# Patient Record
Sex: MALE | Race: WHITE | NOT HISPANIC OR LATINO
[De-identification: names, ages, dates, MRNs, and addresses within clinical notes are randomized per-mention and may not be internally consistent; named-entity substitution may affect disease eponyms.]

---
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
## 2019-12-06 ENCOUNTER — TRANSCRIPTION ENCOUNTER (OUTPATIENT)
Age: 19
End: 2019-12-06

## 2020-04-09 ENCOUNTER — TRANSCRIPTION ENCOUNTER (OUTPATIENT)
Age: 20
End: 2020-04-09

## 2020-10-23 ENCOUNTER — TRANSCRIPTION ENCOUNTER (OUTPATIENT)
Age: 20
End: 2020-10-23

## 2020-11-23 ENCOUNTER — TRANSCRIPTION ENCOUNTER (OUTPATIENT)
Age: 20
End: 2020-11-23

## 2021-01-25 ENCOUNTER — APPOINTMENT (EMERGENCY)
Dept: CT IMAGING | Facility: HOSPITAL | Age: 21
End: 2021-01-25
Payer: COMMERCIAL

## 2021-01-25 ENCOUNTER — HOSPITAL ENCOUNTER (EMERGENCY)
Facility: HOSPITAL | Age: 21
Discharge: HOME | End: 2021-01-25
Attending: EMERGENCY MEDICINE
Payer: COMMERCIAL

## 2021-01-25 VITALS
BODY MASS INDEX: 24.38 KG/M2 | HEART RATE: 78 BPM | HEIGHT: 72 IN | TEMPERATURE: 97.9 F | OXYGEN SATURATION: 98 % | SYSTOLIC BLOOD PRESSURE: 122 MMHG | WEIGHT: 180 LBS | DIASTOLIC BLOOD PRESSURE: 57 MMHG | RESPIRATION RATE: 18 BRPM

## 2021-01-25 DIAGNOSIS — S06.0X9A CONCUSSION: ICD-10-CM

## 2021-01-25 DIAGNOSIS — S09.90XA HEAD INJURY: Primary | ICD-10-CM

## 2021-01-25 PROCEDURE — 99284 EMERGENCY DEPT VISIT MOD MDM: CPT

## 2021-01-25 PROCEDURE — 70450 CT HEAD/BRAIN W/O DYE: CPT

## 2021-01-25 PROCEDURE — 99284 EMERGENCY DEPT VISIT MOD MDM: CPT | Performed by: EMERGENCY MEDICINE

## 2021-01-25 RX ORDER — METOCLOPRAMIDE 10 MG/1
10 TABLET ORAL EVERY 6 HOURS PRN
Qty: 20 TABLET | Refills: 0 | Status: SHIPPED | OUTPATIENT
Start: 2021-01-25

## 2021-01-25 RX ORDER — ACETAMINOPHEN 325 MG/1
650 TABLET ORAL ONCE
Status: COMPLETED | OUTPATIENT
Start: 2021-01-25 | End: 2021-01-25

## 2021-01-25 RX ADMIN — ACETAMINOPHEN 650 MG: 325 TABLET, FILM COATED ORAL at 15:19

## 2021-01-25 NOTE — Clinical Note
Yovanny Manning was seen and treated in our emergency department on 1/25/2021  Diagnosis:     Earlene Good  may return to school on return date, may return to work on return date  He may return on this date: 01/30/2021         If you have any questions or concerns, please don't hesitate to call        Sharita Hilliard MD    ______________________________           _______________          _______________  Hospital Representative                              Date                                Time

## 2021-01-25 NOTE — ED PROVIDER NOTES
History  Chief Complaint   Patient presents with    Head Injury     Pt states he was snowboarding and fell hitting his head  Pt states he was wearing a helmet and no LOC        History provided by:  Patient   used: No    Head Injury w/unknown LOC  Location:  Occipital  Mechanism of injury: fall    Fall:     Fall occurred:  Skiing/snowboarding    Height of fall:  Standing    Impact surface:  [employer] of impact:  Head    Entrapped after fall: no    Pain details:     Quality:  Aching    Radiates to: Face    Severity:  Moderate    Timing:  Constant    Progression:  Unchanged  Chronicity:  New  Relieved by:  Nothing  Worsened by:  Nothing  Ineffective treatments:  None tried  Associated symptoms: headache and nausea    Associated symptoms: no blurred vision, no loss of consciousness, no memory loss, no numbness, no seizures, no tinnitus and no vomiting        None       History reviewed  No pertinent past medical history  History reviewed  No pertinent surgical history  History reviewed  No pertinent family history  I have reviewed and agree with the history as documented  E-Cigarette/Vaping     E-Cigarette/Vaping Substances     Social History     Tobacco Use    Smoking status: Never Smoker    Smokeless tobacco: Never Used   Substance Use Topics    Alcohol use: Not Currently    Drug use: Never       Review of Systems   Constitutional: Negative for chills and fever  HENT: Negative for ear pain, sore throat and tinnitus  Eyes: Negative for blurred vision, pain and visual disturbance  Respiratory: Negative for cough and shortness of breath  Cardiovascular: Negative for chest pain and palpitations  Gastrointestinal: Positive for nausea  Negative for abdominal pain and vomiting  Genitourinary: Negative for dysuria and hematuria  Musculoskeletal: Negative for arthralgias and back pain  Skin: Negative for color change and rash  Neurological: Positive for headaches  Negative for dizziness, seizures, loss of consciousness, syncope, weakness and numbness  Psychiatric/Behavioral: Negative for memory loss  All other systems reviewed and are negative  Physical Exam  Physical Exam  Vitals signs and nursing note reviewed  Constitutional:       Appearance: He is well-developed  HENT:      Head: Normocephalic and atraumatic  Mouth/Throat:      Mouth: Mucous membranes are moist       Pharynx: Oropharynx is clear  Eyes:      Conjunctiva/sclera: Conjunctivae normal    Neck:      Musculoskeletal: Neck supple  Cardiovascular:      Rate and Rhythm: Normal rate and regular rhythm  Heart sounds: No murmur  Pulmonary:      Effort: Pulmonary effort is normal  No respiratory distress  Breath sounds: Normal breath sounds  Abdominal:      Palpations: Abdomen is soft  Tenderness: There is no abdominal tenderness  Musculoskeletal: Normal range of motion  General: No swelling or tenderness  Right lower leg: No edema  Left lower leg: No edema  Skin:     General: Skin is warm and dry  Capillary Refill: Capillary refill takes less than 2 seconds  Neurological:      General: No focal deficit present  Mental Status: He is alert and oriented to person, place, and time  Cranial Nerves: No cranial nerve deficit  Sensory: No sensory deficit  Motor: No weakness        Coordination: Coordination normal       Gait: Gait normal       Deep Tendon Reflexes: Reflexes normal          Vital Signs  ED Triage Vitals [01/25/21 1356]   Temperature Pulse Respirations Blood Pressure SpO2   97 9 °F (36 6 °C) 62 18 130/72 97 %      Temp Source Heart Rate Source Patient Position - Orthostatic VS BP Location FiO2 (%)   Oral Monitor Sitting Left arm --      Pain Score       6           Vitals:    01/25/21 1356 01/25/21 1509   BP: 130/72 122/57   Pulse: 62 78   Patient Position - Orthostatic VS: Sitting Sitting         Visual Acuity  Visual Acuity      Most Recent Value   L Pupil Size (mm)  4   R Pupil Size (mm)  4          ED Medications  Medications   acetaminophen (TYLENOL) tablet 650 mg (650 mg Oral Given 1/25/21 1519)       Diagnostic Studies  Results Reviewed     None                 CT head without contrast   Final Result by Winifred Mock MD (01/25 1625)      No acute intracranial abnormality  Moderate frontal sinus disease  Workstation performed: [id number]                    Procedures  Procedures         ED Course                             SBIRT 20yo+      Most Recent Value   SBIRT (24 yo +)   In order to provide better care to our patients, we are screening all of our patients for alcohol and drug use  Would it be okay to ask you these screening questions? Unable to answer at this time Filed at: 01/25/2021 1520                    MDM  Number of Diagnoses or Management Options  Concussion: new and requires workup  Head injury: new and requires workup  Diagnosis management comments: Fall with head strike will snowboarding  Helmeted, does not think LOC but not entirely sure, reports feeling "dazed " Unsure of speed before fall, reports "I was going pretty fast " no neck pain, no midline tenderness, normal neuro exam  Ct head performed given unsure of LOC and mechanism  No ICH  Suspect concussion  Discussed importance of avoiding high risk activities due to risk of second impact syndrome, discussed mental and physical rest, need for f/u with pcp or concussion specialist  Discussed return precautions          Amount and/or Complexity of Data Reviewed  Tests in the radiology section of CPT®: reviewed and ordered  Review and summarize past medical records: yes  Independent visualization of images, tracings, or specimens: yes        Disposition  Final diagnoses:   Head injury   Concussion     Time reflects when diagnosis was documented in both MDM as applicable and the Disposition within this note     Time User Action Codes Description Comment    1/25/2021  4:35 PM Maria De Jesus Mcdonald [[id number]] Head injury     1/25/2021  4:35 PM Maria De Jesus Mcdonald [[id number]] Concussion       ED Disposition     ED Disposition Condition Date/Time Comment    Discharge Stable Mon Jan 25, 2021  4:35 PM Juju Amador discharge to home/self care  Follow-up Information     Follow up With Specialties Details Why Contact Info    Modesto Pearce MD Internal Medicine   [address] [place] [address] Rd  [phone]            Discharge Medication List as of 1/25/2021  4:36 PM      START taking these medications    Details   metoclopramide (REGLAN) 10 mg tablet Take 1 tablet (10 mg total) by mouth every 6 (six) hours as needed (n/v or headache), Starting Mon 1/25/2021, Print           No discharge procedures on file      PDMP Review     None          ED Provider  Electronically Signed by           Sheela Doan MD  02/04/21 3980

## 2021-02-13 ENCOUNTER — TRANSCRIPTION ENCOUNTER (OUTPATIENT)
Age: 21
End: 2021-02-13

## 2021-05-08 ENCOUNTER — TRANSCRIPTION ENCOUNTER (OUTPATIENT)
Age: 21
End: 2021-05-08

## 2021-07-04 ENCOUNTER — TRANSCRIPTION ENCOUNTER (OUTPATIENT)
Age: 21
End: 2021-07-04

## 2021-07-04 ENCOUNTER — EMERGENCY (EMERGENCY)
Facility: HOSPITAL | Age: 21
LOS: 0 days | Discharge: HOME | End: 2021-07-05
Attending: EMERGENCY MEDICINE | Admitting: EMERGENCY MEDICINE
Payer: COMMERCIAL

## 2021-07-04 VITALS
OXYGEN SATURATION: 97 % | SYSTOLIC BLOOD PRESSURE: 128 MMHG | HEART RATE: 85 BPM | RESPIRATION RATE: 18 BRPM | DIASTOLIC BLOOD PRESSURE: 63 MMHG | WEIGHT: 184.97 LBS | TEMPERATURE: 98 F

## 2021-07-04 DIAGNOSIS — R05 COUGH: ICD-10-CM

## 2021-07-04 DIAGNOSIS — J32.9 CHRONIC SINUSITIS, UNSPECIFIED: ICD-10-CM

## 2021-07-04 DIAGNOSIS — J02.9 ACUTE PHARYNGITIS, UNSPECIFIED: ICD-10-CM

## 2021-07-04 DIAGNOSIS — R09.81 NASAL CONGESTION: ICD-10-CM

## 2021-07-04 DIAGNOSIS — Z91.010 ALLERGY TO PEANUTS: ICD-10-CM

## 2021-07-04 PROCEDURE — 99283 EMERGENCY DEPT VISIT LOW MDM: CPT

## 2021-07-04 NOTE — ED ADULT TRIAGE NOTE - CHIEF COMPLAINT QUOTE
pt complains of headache, recently dx with sinus infection, and did not get a chance to  his abx. pain got worse so he came to ed.

## 2021-07-05 RX ADMIN — Medication 1 TABLET(S): at 00:17

## 2021-07-05 NOTE — ED PROVIDER NOTE - PATIENT PORTAL LINK FT
You can access the FollowMyHealth Patient Portal offered by [facility] by registering at the following website: [email or web address]. By joining [employer]’s FollowMyHealth portal, you will also be able to view your health information using other applications (apps) compatible with our system.

## 2021-07-05 NOTE — ED PROVIDER NOTE - PROGRESS NOTE DETAILS
MARTHA: Reviewed necessity for follow up. Counseled on red flags and to return for them.  Patient appears well on discharge.

## 2021-07-05 NOTE — ED PROVIDER NOTE - NSFOLLOWUPINSTRUCTIONS_ED_ALL_ED_FT
Sinusitis    sinusitis is a long-term swelling or infection of the sinuses. If sinusitis lasts more than 12 weeks (90 days), it is called chronic.    Front view of sinuses showing pale, swollen mucosa and mucus buildup.    What is chronic sinusitis?  Sinuses are air-filled spaces in the skull behind the face. They are kept moist and clean by a lining of mucosa. Things such as pollen, smoke, and chemical fumes can bother the mucosa. Constant exposure to irritants can cause ongoing swelling of this lining. It can also harm tiny hairlike cilia that cover the mucosa. Cilia help carry mucus toward the opening of the sinus. Damage to cilia keeps mucus from draining from the sinuses.    Causes of chronic sinusitis  Problems that irritate the mucosa or block drainage can lead to chronic sinusitis. These may include:    Infections    Chronic allergies    Nasal polyps, deviated septum, or other blockages    Ongoing exposure to irritants, such as cigarette smoke or fumes    Asthma    Acute sinusitis that keeps coming back    Common symptoms of chronic sinusitis  Symptoms may include:    Facial pain and pressure    Headache and sinus pain    Nasal congestion    Thick, colored drainage from the nose    Thick mucus draining down the back of the throat (postnasal drainage)    Loss of smell    Fever    Cough    Sore throat    Diagnosing chronic sinusitis  Your healthcare provider will ask about your symptoms and past health. He or she will look at your nose and face. You may have imaging studies such as an X-ray or CT scan of the sinuses. Your healthcare provider may also take a sample of the drainage to check for bacteria. You may also have an endoscopy. During this test, the healthcare provider puts a lighted tube up your nose to look at your sinuses.    Treating chronic sinusitis  The goal of treatment is to reduce irritation and swelling. Your plan may involve:    Taking medicines. Your healthcare provider may give you medicines to reduce the amount of mucus and swelling. These help unblock the sinuses and let them drain. You will need to take antibiotics if you have a bacterial infection.    Flushing your sinuses. Your healthcare provider may suggest sinus irrigation. This is flushing your sinuses with saltwater or saline solution. This helps to clear out mucus.    Controlling allergies. If you have allergies, you should have a plan to help control them. You may need to take medicines or get allergy shots.    Controlling nasal irritants. If you smoke, ask your healthcare provider for help to stop.    Having surgery. In some cases, you may need surgery on the nose, sinuses, or both. This can improve sinus drainage or remove nasal blockages.

## 2021-07-05 NOTE — ED PROVIDER NOTE - PRINCIPAL DIAGNOSIS
Abdomen soft, non-tender, no guarding. the stoma site is present in luq but no tube present in stoma and stoma apepars closed at internal site when tried to place peg tube
Sinusitis

## 2021-07-05 NOTE — ED PROVIDER NOTE - ATTENDING CONTRIBUTION TO CARE
20yr old male no sig pmhx here for eval of sinus pressure. pt reports was having cough that started about 2 weeks ago, now with 3 day of sinus pressure. pain initially to left frontal sinus, now extending to right sinus. no fever, chill. pt took dayquil w/o improvement.  the pt went to urgent care earlier in the day, given a abx he isnt sure of but told that he should avoid the sun (presumed doxy). The pt didn't get a change to pick it up so came to the ed. on exam pt no distress, s12s2 ctab, perrla eomi, cn2-12 intact, left frontal sinus ttp, normal posterior op, neck supple, neuro grossly unremarkable.     impression sinusitis, abx, dc

## 2021-07-05 NOTE — ED PROVIDER NOTE - PHYSICAL EXAMINATION
PHYSICAL EXAM:    GENERAL: Alert, appears stated age, well appearing, non-toxic  SKIN: Warm, pink and dry. MMM.   HEAD: NC  EYE: Normal lids/conjunctiva  ENT: Normal hearing, patent oropharynx without erythema or exudate, TMs clear b/l. uvula midline. +b/l frontal/maxillary sinus TTP.   NECK: +supple. No meningismus, or JVD  Pulm: Bilateral BS, normal resp effort, no wheezes, stridor, or retractions  CV: RRR, no M/R/G, 2+and = radial pulses  Abd: soft, non-tender, non-distended, no hepatosplenomegaly.  Mskel: no erythema, cyanosis, edema. no calf tenderness  Neuro: AAOx3. normal gait.

## 2021-07-05 NOTE — ED PROVIDER NOTE - NS ED ROS FT
Review of Systems    Constitutional: (-) fever   Eyes/ENT: (-) vision changes  Cardiovascular: (-) chest pain, (-) syncope (-) palpitations  Respiratory: (-) cough, (-) shortness of breath  Gastrointestinal: (-) vomiting, (-) diarrhea (-) abdominal pain  Genitourinary:  (-) dysuria   Musculoskeletal: (-) neck pain, (-) back pain, (-) leg pain/swelling  Integumentary: (-) rash, (-) edema  Neurological: (-) headache, (-) confusion  Hematologic: (-) easy bruising

## 2021-07-05 NOTE — ED PROVIDER NOTE - OBJECTIVE STATEMENT
21 y/o M without PMH presents with moderate constant throbbing non-radiating frontal/maxillary sinus pressure x 3 days. no palliating/provoking factors. in the setting of recent cough/congestion/sore throat/runny nose x 2 wks. no fever/cp/sob/ab pain/n/v/d.   went to [facility] in am, rxed unknown abx but given sun precautions and did not yet take a dose, but came to ed.

## 2021-12-15 ENCOUNTER — EMERGENCY (EMERGENCY)
Facility: HOSPITAL | Age: 21
LOS: 0 days | Discharge: HOME | End: 2021-12-15
Attending: EMERGENCY MEDICINE | Admitting: EMERGENCY MEDICINE
Payer: OTHER MISCELLANEOUS

## 2021-12-15 VITALS
TEMPERATURE: 98 F | SYSTOLIC BLOOD PRESSURE: 149 MMHG | DIASTOLIC BLOOD PRESSURE: 89 MMHG | RESPIRATION RATE: 18 BRPM | HEART RATE: 88 BPM | WEIGHT: 179.9 LBS | OXYGEN SATURATION: 99 %
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
## 2021-12-15 DIAGNOSIS — M25.562 PAIN IN LEFT KNEE: ICD-10-CM
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
## 2021-12-15 DIAGNOSIS — Z91.010 ALLERGY TO PEANUTS: ICD-10-CM

## 2021-12-15 PROCEDURE — 99283 EMERGENCY DEPT VISIT LOW MDM: CPT

## 2021-12-15 PROCEDURE — 73564 X-RAY EXAM KNEE 4 OR MORE: CPT | Mod: 26,LT

## 2021-12-15 NOTE — ED PROVIDER NOTE - CLINICAL SUMMARY MEDICAL DECISION MAKING FREE TEXT BOX
x-ray neg, pt given ace wrap, to f/u with ortho as outpt, told to return to ER for any new/concerning symptoms.  pt understands and agrees with plan.

## 2021-12-15 NOTE — ED PROVIDER NOTE - PHYSICAL EXAMINATION
Physical Exam    Vital Signs: I have reviewed the initial vital signs.  Constitutional: well-nourished, appears stated age, no acute distress  Musculoskeletal:  no lower extremity edema, minimally painful rom or L knee but, moving all extremities appropriately, no gross bony deformities or swelling. No anterior draw sign no posterior drawer, no joint laxity, strong pedal pulses b/l no knee effusion  Integumentary: warm, dry, no rashes, lacerations,  Neurologic: extremities’ motor and sensory functions grossly intact, no foot drop

## 2021-12-15 NOTE — ED PROVIDER NOTE - CARE PROVIDER_API CALL
Davey Aguirre (MD)  Orthopaedic Surgery  [address] [place] [address]  Phone: [phone]  Fax: [phone]  Follow Up Time:

## 2021-12-15 NOTE — ED PROVIDER NOTE - OBJECTIVE STATEMENT
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 Is able to bear weight no paresthesias, no deformity ,swelling redness fever or chills. Took no meds for pain pta.

## 2021-12-15 NOTE — ED PROVIDER NOTE - NS ED ROS FT
Constitutional: (-) fever (-) chills (-) (-) lightheadedness     Cardiovascular: (-) calf swelling  Musculoskeletal: (-) neck pain, (-) back pain, (+) joint pain (-) joint swelling (-) painful ROM  Integumentary: (-) rash, (-) edema (-) lacerations (-) pruritis   Neurological: (-) headache, (-) altered mental status (-) LOC (-) dizziness (-) paresthesias (-) gait abnormalities

## 2021-12-15 NOTE — ED PROVIDER NOTE - PATIENT PORTAL LINK FT
You can access the FollowMyHealth Patient Portal offered by [facility] by registering at the following website: [email or web address]. By joining [employer]’s FollowMyHealth portal, you will also be able to view your health information using other applications (apps) compatible with our system.

## 2021-12-15 NOTE — ED PROVIDER NOTE - CARE PROVIDERS DIRECT ADDRESSES
[email or web address]

## 2021-12-15 NOTE — ED ADULT NURSE NOTE - NSIMPLEMENTINTERV_GEN_ALL_ED
Implemented All Universal Safety Interventions:  [place] to call system. Call bell, personal items and telephone within reach. Instruct patient to call for assistance. Room bathroom lighting operational. Non-slip footwear when patient is off stretcher. Physically safe environment: no spills, clutter or unnecessary equipment. Stretcher in lowest position, wheels locked, appropriate side rails in place.

## 2021-12-15 NOTE — ED PROVIDER NOTE - NSFOLLOWUPINSTRUCTIONS_ED_ALL_ED_FT
MAKE AN APPOINTMENT WITH THE ORTHOPEDIST WITHIN 1-3 DAYS OF YOUR ER VISIT.    Knee Pain    WHAT YOU NEED TO KNOW:    What do I need to know about knee pain? Knee pain may start suddenly, or it may be a long-term problem. You may have pain on the side, front, or back of your knee. You may have knee stiffness and swelling. You may hear popping sounds or feel like your knee is giving way or locking up as you walk. You may feel pain when you sit, stand, walk, or climb up and down stairs.    What increases my risk for knee pain?     Obesity      A strain or tear in ligaments or tendons      A leg fracture or knee dislocation      Overuse of your knee      Osteoarthritis, rheumatoid arthritis, or gout      An infection, tumor, or cyst in your knee      Shoes that are not supportive, or training on a hard surface      Sports that involve jumping or pivoting on your knee    How is the cause of knee pain diagnosed? Your healthcare provider will examine your knee and ask about your symptoms. Tell your provider when the pain started and what you were doing at the time. Describe the pain, such as sharp, throbbing, or achy. Tell your provider about any knee injury or surgery you had. You may need any of the following:     MRI, CT, or ultrasound pictures may show an injury, fracture, or tumor.       Blood tests may be used to check the level of inflammation in your blood. The tests may also show signs of infection.      Arthroscopy is a procedure to look inside your knee joint with an arthroscope. An arthroscope is a flexible tube with a light and camera on the end. A knee arthroscopy is usually done to check for disease or damage inside your knee. These problems may be fixed during the procedure.    How is knee pain treated? Treatment will depend on the cause of your pain. You may need any of the following:     NSAIDs help decrease swelling and pain or fever. This medicine is available with or without a doctor's order. NSAIDs can cause stomach bleeding or kidney problems in certain people. If you take blood thinner medicine, always ask your healthcare provider if NSAIDs are safe for you. Always read the medicine label and follow directions.      Acetaminophen decreases pain and fever. It is available without a doctor's order. Ask how much to take and how often to take it. Follow directions. Read the labels of all other medicines you are using to see if they also contain acetaminophen, or ask your doctor or pharmacist. Acetaminophen can cause liver damage if not taken correctly. Do not use more than 4 grams (4,000 milligrams) total of acetaminophen in one day.       Prescription pain medicine may be given. Ask your healthcare provider how to take this medicine safely. Some prescription pain medicines contain acetaminophen. Do not take other medicines that contain acetaminophen without talking to your healthcare provider. Too much acetaminophen may cause liver damage. Prescription pain medicine may cause constipation. Ask your healthcare provider how to prevent or treat constipation.       Steroid injections may be given into your knee. Steroids reduce inflammation and pain.      Surgery may be used for some injuries, such as to repair a torn ACL.    What can I do to manage my symptoms?     Rest your knee so it can heal. Limit activities that increase your pain. Do low-impact exercises, such as walking or swimming.       Apply ice to help reduce swelling and pain. Use an ice pack, or put crushed ice in a plastic bag. Cover it with a towel before you apply it to your knee. Apply ice for 15 to 20 minutes every hour, or as directed.      Apply compression to help reduce swelling. Use a brace or bandage only as directed.      Elevate your knee to help decrease pain and swelling. Elevate your knee while you are sitting or lying down. Prop your leg on pillows to keep your knee above the level of your heart.      Prevent your knee from moving as directed. Your healthcare provider may put on a cast or splint. You may need to wear a leg brace to stabilize your knee. A leg brace can be adjusted to increase your range of motion as your knee heals.Hinged Knee Braces          What can I do to prevent knee pain?     Maintain a healthy weight. Extra weight increases your risk for knee pain. Ask your healthcare provider how much you should weigh. He or she can help you create a safe weight loss plan if you need to lose weight.      Exercise or train properly. Use the correct equipment for sports. Wear shoes that provide good support. Check your posture often as you exercise, play sports, or train for an event. This can help prevent stress and strain on your knees. Rest between sessions so you do not overwork your knees.    When should I seek immediate care?     Your pain is worse, even after treatment.       You cannot bend or straighten your leg completely.       The swelling around your knee does not go down even with treatment.      Your knee is painful and hot to the touch.     When should I contact my healthcare provider?     You have questions or concerns about your condition or care.         CARE AGREEMENT:    You have the right to help plan your care. Learn about your health condition and how it may be treated. Discuss treatment options with your healthcare providers to decide what care you want to receive. You always have the right to refuse treatment.

## 2021-12-15 NOTE — ED PROVIDER NOTE - ATTENDING CONTRIBUTION TO CARE
21 y/o male with no sig pmhx in ER with c/o L knee pain.  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  did not fall, did not hit or twist knee.  no calf pain/swelling.  no other complaints.  PE - nad, LLE: + mild tenderness to anterior knee, no deformity, mobile patella, no joint laxity, no calf tenderness/swelling.  -x-ray/ortho f/u

## 2022-03-18 ENCOUNTER — TRANSCRIPTION ENCOUNTER (OUTPATIENT)
Age: 22
End: 2022-03-18

## 2022-11-15 ENCOUNTER — NON-APPOINTMENT (OUTPATIENT)
Age: 22
End: 2022-11-15

## 2023-02-10 NOTE — ED PROVIDER NOTE - NS ED MD DISPO DISCHARGE CCDA
Patient/Caregiver provided printed discharge information. Finasteride Pregnancy And Lactation Text: This medication is absolutely contraindicated during pregnancy. It is unknown if it is excreted in breast milk.

## 2023-09-11 ENCOUNTER — EMERGENCY (EMERGENCY)
Facility: HOSPITAL | Age: 23
LOS: 0 days | Discharge: HOME | End: 2023-09-11
Attending: EMERGENCY MEDICINE
Payer: COMMERCIAL

## 2023-09-11 VITALS
HEART RATE: 111 BPM | TEMPERATURE: 98 F | RESPIRATION RATE: 18 BRPM | OXYGEN SATURATION: 100 % | DIASTOLIC BLOOD PRESSURE: 72 MMHG | SYSTOLIC BLOOD PRESSURE: 135 MMHG | WEIGHT: 181 LBS

## 2023-09-11 VITALS
RESPIRATION RATE: 16 BRPM | SYSTOLIC BLOOD PRESSURE: 123 MMHG | OXYGEN SATURATION: 98 % | DIASTOLIC BLOOD PRESSURE: 68 MMHG | HEART RATE: 64 BPM

## 2023-09-11 DIAGNOSIS — Z91.010 ALLERGY TO PEANUTS: ICD-10-CM

## 2023-09-11 DIAGNOSIS — H53.8 OTHER VISUAL DISTURBANCES: ICD-10-CM

## 2023-09-11 DIAGNOSIS — H54.7 UNSPECIFIED VISUAL LOSS: ICD-10-CM

## 2023-09-11 DIAGNOSIS — R51.9 HEADACHE, UNSPECIFIED: ICD-10-CM

## 2023-09-11 LAB
ALBUMIN SERPL ELPH-MCNC: 5 G/DL — SIGNIFICANT CHANGE UP (ref 3.5–5.2)
ALP SERPL-CCNC: 52 U/L — SIGNIFICANT CHANGE UP (ref 30–115)
ALT FLD-CCNC: 24 U/L — SIGNIFICANT CHANGE UP (ref 0–41)
ANION GAP SERPL CALC-SCNC: 12 MMOL/L — SIGNIFICANT CHANGE UP (ref 7–14)
APTT BLD: 38.1 SEC — SIGNIFICANT CHANGE UP (ref 27–39.2)
AST SERPL-CCNC: 24 U/L — SIGNIFICANT CHANGE UP (ref 0–41)
BASOPHILS # BLD AUTO: 0.04 K/UL — SIGNIFICANT CHANGE UP (ref 0–0.2)
BASOPHILS NFR BLD AUTO: 0.7 % — SIGNIFICANT CHANGE UP (ref 0–1)
BILIRUB SERPL-MCNC: 0.7 MG/DL — SIGNIFICANT CHANGE UP (ref 0.2–1.2)
BUN SERPL-MCNC: 11 MG/DL — SIGNIFICANT CHANGE UP (ref 10–20)
CALCIUM SERPL-MCNC: 9.8 MG/DL — SIGNIFICANT CHANGE UP (ref 8.4–10.5)
CHLORIDE SERPL-SCNC: 103 MMOL/L — SIGNIFICANT CHANGE UP (ref 98–110)
CK MB CFR SERPL CALC: 5.1 NG/ML — SIGNIFICANT CHANGE UP (ref 0.6–6.3)
CK SERPL-CCNC: 425 U/L — HIGH (ref 0–225)
CO2 SERPL-SCNC: 25 MMOL/L — SIGNIFICANT CHANGE UP (ref 17–32)
CREAT SERPL-MCNC: 1.2 MG/DL — SIGNIFICANT CHANGE UP (ref 0.7–1.5)
EGFR: 87 ML/MIN/1.73M2 — SIGNIFICANT CHANGE UP
EOSINOPHIL # BLD AUTO: 0.14 K/UL — SIGNIFICANT CHANGE UP (ref 0–0.7)
EOSINOPHIL NFR BLD AUTO: 2.5 % — SIGNIFICANT CHANGE UP (ref 0–8)
GLUCOSE SERPL-MCNC: 90 MG/DL — SIGNIFICANT CHANGE UP (ref 70–99)
HCT VFR BLD CALC: 42.3 % — SIGNIFICANT CHANGE UP (ref 42–52)
HGB BLD-MCNC: 15 G/DL — SIGNIFICANT CHANGE UP (ref 14–18)
IMM GRANULOCYTES NFR BLD AUTO: 0.2 % — SIGNIFICANT CHANGE UP (ref 0.1–0.3)
INR BLD: 1.05 RATIO — SIGNIFICANT CHANGE UP (ref 0.65–1.3)
LYMPHOCYTES # BLD AUTO: 1.63 K/UL — SIGNIFICANT CHANGE UP (ref 1.2–3.4)
LYMPHOCYTES # BLD AUTO: 29.5 % — SIGNIFICANT CHANGE UP (ref 20.5–51.1)
MCHC RBC-ENTMCNC: 32.9 PG — HIGH (ref 27–31)
MCHC RBC-ENTMCNC: 35.5 G/DL — SIGNIFICANT CHANGE UP (ref 32–37)
MCV RBC AUTO: 92.8 FL — SIGNIFICANT CHANGE UP (ref 80–94)
MONOCYTES # BLD AUTO: 0.61 K/UL — HIGH (ref 0.1–0.6)
MONOCYTES NFR BLD AUTO: 11 % — HIGH (ref 1.7–9.3)
NEUTROPHILS # BLD AUTO: 3.1 K/UL — SIGNIFICANT CHANGE UP (ref 1.4–6.5)
NEUTROPHILS NFR BLD AUTO: 56.1 % — SIGNIFICANT CHANGE UP (ref 42.2–75.2)
NRBC # BLD: 0 /100 WBCS — SIGNIFICANT CHANGE UP (ref 0–0)
PLATELET # BLD AUTO: 191 K/UL — SIGNIFICANT CHANGE UP (ref 130–400)
PMV BLD: 10.5 FL — HIGH (ref 7.4–10.4)
POTASSIUM SERPL-MCNC: 3.6 MMOL/L — SIGNIFICANT CHANGE UP (ref 3.5–5)
POTASSIUM SERPL-SCNC: 3.6 MMOL/L — SIGNIFICANT CHANGE UP (ref 3.5–5)
PROT SERPL-MCNC: 7.8 G/DL — SIGNIFICANT CHANGE UP (ref 6–8)
PROTHROM AB SERPL-ACNC: 12 SEC — SIGNIFICANT CHANGE UP (ref 9.95–12.87)
RBC # BLD: 4.56 M/UL — LOW (ref 4.7–6.1)
RBC # FLD: 11.9 % — SIGNIFICANT CHANGE UP (ref 11.5–14.5)
SODIUM SERPL-SCNC: 140 MMOL/L — SIGNIFICANT CHANGE UP (ref 135–146)
TROPONIN T SERPL-MCNC: <0.01 NG/ML — SIGNIFICANT CHANGE UP
WBC # BLD: 5.53 K/UL — SIGNIFICANT CHANGE UP (ref 4.8–10.8)
WBC # FLD AUTO: 5.53 K/UL — SIGNIFICANT CHANGE UP (ref 4.8–10.8)

## 2023-09-11 PROCEDURE — 0042T: CPT | Mod: MA

## 2023-09-11 PROCEDURE — 70450 CT HEAD/BRAIN W/O DYE: CPT | Mod: MA

## 2023-09-11 PROCEDURE — 93010 ELECTROCARDIOGRAM REPORT: CPT

## 2023-09-11 PROCEDURE — 80053 COMPREHEN METABOLIC PANEL: CPT

## 2023-09-11 PROCEDURE — 82962 GLUCOSE BLOOD TEST: CPT

## 2023-09-11 PROCEDURE — 70498 CT ANGIOGRAPHY NECK: CPT | Mod: MA

## 2023-09-11 PROCEDURE — 84484 ASSAY OF TROPONIN QUANT: CPT

## 2023-09-11 PROCEDURE — 76512 OPH US DX B-SCAN: CPT | Mod: 50

## 2023-09-11 PROCEDURE — 70496 CT ANGIOGRAPHY HEAD: CPT | Mod: MA

## 2023-09-11 PROCEDURE — 93005 ELECTROCARDIOGRAM TRACING: CPT

## 2023-09-11 PROCEDURE — 70498 CT ANGIOGRAPHY NECK: CPT | Mod: 26,MA

## 2023-09-11 PROCEDURE — 70496 CT ANGIOGRAPHY HEAD: CPT | Mod: 26,MA

## 2023-09-11 PROCEDURE — 36415 COLL VENOUS BLD VENIPUNCTURE: CPT

## 2023-09-11 PROCEDURE — 99285 EMERGENCY DEPT VISIT HI MDM: CPT

## 2023-09-11 PROCEDURE — 96374 THER/PROPH/DIAG INJ IV PUSH: CPT

## 2023-09-11 PROCEDURE — 85025 COMPLETE CBC W/AUTO DIFF WBC: CPT

## 2023-09-11 PROCEDURE — 85610 PROTHROMBIN TIME: CPT

## 2023-09-11 PROCEDURE — 70450 CT HEAD/BRAIN W/O DYE: CPT | Mod: 26,MA,59

## 2023-09-11 PROCEDURE — 82550 ASSAY OF CK (CPK): CPT

## 2023-09-11 PROCEDURE — 82553 CREATINE MB FRACTION: CPT

## 2023-09-11 PROCEDURE — 85730 THROMBOPLASTIN TIME PARTIAL: CPT

## 2023-09-11 PROCEDURE — 99285 EMERGENCY DEPT VISIT HI MDM: CPT | Mod: 25

## 2023-09-11 RX ORDER — METOCLOPRAMIDE HCL 10 MG
10 TABLET ORAL ONCE
Refills: 0 | Status: COMPLETED | OUTPATIENT
Start: 2023-09-11 | End: 2023-09-11

## 2023-09-11 RX ORDER — ACETAMINOPHEN 500 MG
650 TABLET ORAL ONCE
Refills: 0 | Status: COMPLETED | OUTPATIENT
Start: 2023-09-11 | End: 2023-09-11

## 2023-09-11 RX ADMIN — Medication 650 MILLIGRAM(S): at 16:17

## 2023-09-11 RX ADMIN — Medication 10 MILLIGRAM(S): at 16:18

## 2023-09-11 NOTE — STROKE CODE NOTE - NS SC SS NIH SEDATED_GEN_A_CORE
[FreeTextEntry1] : 24 y.o.  w/ cervicalgia and periscapular myofascial pain syndrome w/o prominent radicular or myelopathic features.  No need for advanced imaging C-/T-spine at this time.  Short courses of PO NSAIDs prn.  Rx P.T. for modalities, gentle ROM, stretching and strengthening exercises.  May consider TPIs if necessary.  RTC 6-8 weeks.   No

## 2023-09-11 NOTE — CONSULT NOTE ADULT - ATTENDING COMMENTS
22 year old man w/o pmh presented w/ sudden onset headache and L. eye blurriness while bench pressing today. Patient states he was bench pressing when he suddenly felt that his vision in his left eye was off, describing it as "seeing small objects as very large" but no blackout of vision. Shortly thereafter he developed headache, bitemporal, which has now resolved.     On exam, OD 20/20, OS 20/25. Otherwise, normal.     CTH neg  CTA h/n negative  CTP neg    Imp: L. eye monocular vision loss, now improved. Should rule out intrinsic eye pathology. Lower suspicion for ischemic event.     Plan:   Optho eval     70 minutes spent on total encounter. The necessity of the time spent during the encounter on this date of service was due to:     Review of imaging and chart; obtaining history; examination of pt; discussion and coordination of care, and discussion of lifestyle modification and risk factor control.

## 2023-09-11 NOTE — ED PROVIDER NOTE - NSFOLLOWUPINSTRUCTIONS_ED_ALL_ED_FT
Our Emergency Department Referral Coordinators will be reaching out to you in the next 24-48 hours from 9:00am to 5:00pm with a follow up appointment. Please expect a phone call from the hospital in that time frame. If you do not receive a call or if you have any questions or concerns, you can reach them at   [phone]       ____________________________________      Blurred Vision, Adult  Eyeglasses hovering over a Snellen eye chart. The glasses reveal clear letters, while the other letters are blurry.  A person having an eye exam. Eye equipment is placed on the face.  Having blurred vision means that you cannot see things clearly. Your vision may seem fuzzy or out of focus. It can involve your vision for objects that are close or far away. It may affect one or both eyes. There are many causes of blurred vision, including cataracts, macular degeneration, eye inflammation (uveitis), and diabetic retinopathy.    In many cases, blurred vision has to do with the shape of your eye. An abnormal eye shape means you cannot focus well (refractive error). When this happens, it can cause:  Faraway objects to look blurry (nearsightedness).  Close objects to look blurry (farsightedness).  Blurry vision at any distance (astigmatism).  Refractive errors are often corrected with glasses or contacts.    If you have blurred vision, it is best to see an eye care specialist. Blurred vision can be diagnosed based on your symptoms and a complete eye exam. Tell your eye care specialist about any other health problems you have, any recent eye injury, and any prior surgeries. You may need to see an eye care specialist who specializes in medical and surgical eye problems (ophthalmologist). Your treatment will depend on what is causing your blurred vision.    Follow these instructions at home:  Keep all follow-up visits. This is important. These include any visits to your eye specialists.  Do not drive or use machinery if your vision is blurry.  Use eye drops only as told by your health care provider.  If you were prescribed glasses or contact lenses, wear the glasses or contacts as told by your health care provider.  Schedule eye exams regularly.  Pay attention to any changes in your symptoms.  Avoid rubbing your eyes.  Contact a health care provider if:  Your symptoms do not improve or they get worse.  You have:  New symptoms.  A headache.  Trouble seeing at night.  Trouble noticing the difference between colors.  You notice:  Drooping of your eyelids.  Drainage coming from your eyes.  A rash around your eyes.  Get help right away if:  You have:  Severe eye pain.  A severe headache.  A sudden change in vision.  A sudden loss of vision.  A vision change after an injury.  You notice flashing lights in your field of vision. Your field of vision is the area that you can see without moving your eyes.  Summary  Having blurred vision means that you cannot see things clearly. Your vision may seem fuzzy or out of focus.  There are many causes of blurred vision. In many cases, blurred vision has to do with an abnormal eye shape (refractive error), and it can be corrected with glasses or contact lenses.  Pay attention to any changes in your symptoms. Contact a health care provider if your symptoms do not improve or if you have any new symptoms.  This information is not intended to replace advice given to you by your health care provider. Make sure you discuss any questions you have with your health care provider.

## 2023-09-11 NOTE — ED PROVIDER NOTE - PR
1 tablet by mouth daily  Qty: 30 tablet, Refills: 3      amLODIPine (NORVASC) 10 MG tablet Take 1 tablet by mouth daily  Qty: 30 tablet, Refills: 3      B Complex Vitamins (VITAMIN B COMPLEX 100 IJ) Take 1 tablet by mouth daily      melatonin 5 MG TABS tablet Take 2 tablets by mouth      mesalamine (APRISO) 0.375 g extended release capsule       cyanocobalamin 1000 MCG tablet Take by mouth      blood glucose test strips (ASCENSIA AUTODISC VI;ONE TOUCH ULTRA TEST VI) strip Check glucose daily      ONE TOUCH LANCETS MISC Check glucose daily      ferrous sulfate (IRON 325) 325 (65 Fe) MG tablet Take 1 tablet by mouth      Elastic Bandages & Supports (JOBST KNEE HIGH COMPRESSION SM) MISC 1 each by Does not apply route once for 1 dose Thigh high or knee high  Qty: 1 each, Refills: 3      potassium chloride (KLOR-CON M) 20 MEQ extended release tablet Take 1 tablet by mouth 2 times daily      levOCARNitine fumarate (CARNITOR) 250 MG capsule Take 2 capsules by mouth 3 times daily  Qty: 180 capsule, Refills: 1      Lactobacillus (PROBIOTIC ACIDOPHILUS) TABS Take 1 tablet by mouth daily  Qty: 30 tablet, Refills: 0      aspirin 81 MG tablet Take 1 tablet by mouth daily      b complex vitamins capsule Take 1 capsule by mouth daily      atorvastatin (LIPITOR) 40 MG tablet Take 1 tablet by mouth daily      vitamin D (CHOLECALCIFEROL) 1000 UNIT TABS tablet Take 1 tablet by mouth daily      pantoprazole (PROTONIX) 40 MG tablet Take 1 tablet by mouth daily      fenofibrate (TRICOR) 145 MG tablet Take 1 tablet by mouth daily                 Disposition - Re-Admit to Medical unit for stabilization    Follow Up:  See Discharge Instructions     Rivka Reynoso - PMHNP-BC 132

## 2023-09-11 NOTE — ED PROVIDER NOTE - CLINICAL SUMMARY MEDICAL DECISION MAKING FREE TEXT BOX
Stroke work-up negative patient does not have an aneurysm on CT scan.  Patient symptoms resolving.  All results discussed with patient and patient does not require further work-up and management at this time.  Will discharge with appropriate return precautions and patient knows when to seek immediate medical attention.  Patient to follow-up with his doctor and all results given to patient.  Patient offered observation for further work-up and management, however, patient is a good candidate for outpatient management and will follow-up.

## 2023-09-11 NOTE — ED PROVIDER NOTE - ATTENDING CONTRIBUTION TO CARE
I personally evaluated patient. I agree with the findings and plan with all documentation on chart except as documented  in my note.    22-year-old male no past medical history presents to the emergency department for headache and left eye vision changes that started an hour ago.  Patient was working out when he felt a pop in his head and started to have headache and nausea right afterwards and started having blurry vision of his left eye.  Patient denies any numbness, weakness, tingling.  Patient was able to drive to the emergency department by himself.  Patient has continued and residual left eye blurry vision.  Patient reports that things looked bigger than normal out of his left eye.    Stroke code activated in triage and patient brought immediately to a critical care emergency department.  NIH score 0.  Patient is well-appearing and has normal fingerstick.  Gross neurological exam is unremarkable.  Remainder physical exam is unremarkable.  Patient went for emergent head CT and head CTA of head and neck.  1 to ensure the patient does not have an aneurysm and subarachnoid hemorrhage.  Patient given Reglan and Tylenol for headache.  We will follow-up results and neurology evaluation and reassess.

## 2023-09-11 NOTE — ED PROVIDER NOTE - PHYSICAL EXAMINATION
VITAL SIGNS: I have reviewed nursing notes and confirm.  CONSTITUTIONAL: Well-developed; well-nourished; in no acute distress.  SKIN: Skin exam is warm and dry, no acute rash.  HEAD: Normocephalic; atraumatic.  EYES: PERRL, EOM intact; conjunctiva and sclera clear. no obvious abnormalities of L eye  ENT: mmm  CARD: S1, S2 normal; no murmurs, gallops, or rubs. Regular rate and rhythm.  RESP: Normal respiratory effort, no tachypnea or distress.   EXT: Normal ROM. No clubbing, cyanosis or edema.  Neuro: A&Ox3, normal speech, CN II-XII intact, FROM & strength 5/5 x4 extremities. Normal gait  PSYCH: Cooperative, appropriate.

## 2023-09-11 NOTE — STROKE CODE NOTE - IV ALTEPLASE EXCLUSION ABS OTHER
Patient with mild rapidly improving symptoms not a candidate for IV or IA acute stroke intervention.

## 2023-09-11 NOTE — ED PROVIDER NOTE - DIFFERENTIAL DIAGNOSIS
The differential diagnosis for patients clinical presentation includes but is not limited to:  SAH, tension headache, migraine, cluster headache, stroke, brain tumor,  temporal arteritis, vitreous hemorrhage Differential Diagnosis

## 2023-09-11 NOTE — CONSULT NOTE ADULT - ASSESSMENT
Impression:  Patient is a 22 year old gentleman without a PMH presents to the ED after left eye visual deficit while lifting weights. Patient reports bench pressing and feeling an acute "pop" behind his left eye. Patient reports standing and his left eye vision was distorted for one hour. Vastly improved vision upon arrival to ED, stroke code called. Reports subtle left frontal headache and mild nausea. CTH without acute intracranial pathology. Patient reports vastly improved vision but subtle haziness in left eye but does not have his glasses with him. Patient not a candidate for NIHSS 0, visual fields are intact non scorable subtle deficit. 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 Etiology of symptoms less likely neurovascular and more likely isolated to the left eye.     Suggestion:  Would suggest opthalmology evaluation  Dispo as per optho.

## 2023-09-11 NOTE — ED ADULT TRIAGE NOTE - CHIEF COMPLAINT QUOTE
Pt presents to the w/ c/o of left eye vision change. Pt states he was lifting at around 1400 when he heard a pop behind his left eye. Pt had vision change in the left eye and headache associated w/ nausea shortly after. Pt states symptoms has mostly resolved since then. Pt presents to the w/ c/o of left eye vision change. Pt states he was lifting at around 1400 when he heard a pop behind his left eye. Pt had vision change in the left eye and headache associated w/ nausea shortly after. Pt states symptoms has mostly resolved since then. Code stroke called @ 0547

## 2023-09-11 NOTE — ED ADULT NURSE NOTE - NSFALLUNIVINTERV_ED_ALL_ED
Headache started this morning, Stabbing pain behind left ear, \"feels like it comes from 5th vert). Rates 10/10. Taken medication without relief. Denies vision problem, denies dizziness and nausea.
Bed/Stretcher in lowest position, wheels locked, appropriate side rails in place/Call bell, personal items and telephone in reach/Instruct patient to call for assistance before getting out of bed/chair/stretcher/Non-slip footwear applied when patient is off stretcher/[place] to call system/Physically safe environment - no spills, clutter or unnecessary equipment/Purposeful proactive rounding/Room/bathroom lighting operational, light cord in reach

## 2023-09-11 NOTE — ED PROVIDER NOTE - OBJECTIVE STATEMENT
22-year-old male with no PMH, no anticoagulation, presents ED for evaluation of left eye vision change x1 hour.  Reports he was working out when he felt a pop behind his left eye and afterward had blurry vision.  Now notes left-sided headache that is sharp, throbbing without modifying or relieving factors.  Denies extremity numbness/weakness/paresthesias.  Has no other complaints at this time. Denies fall or other trauma. Patient drove himself to the emergency room and ambulated into critical care. Code stroke called on arrival 22-year-old male with no PMH, no anticoagulation, presents ED for evaluation of left eye vision change x1 hour.  Reports he was working out when he felt a pop behind his left eye and afterward had blurry vision.  Now notes left-sided headache that is sharp, throbbing without modifying or relieving factors.  Denies extremity numbness/weakness/paresthesias.  Has no other complaints at this time. Denies fall or other trauma. Patient drove himself to the emergency room and ambulated into critical care. Code stroke called on arrival.

## 2023-09-11 NOTE — ED ADULT NURSE NOTE - OBJECTIVE STATEMENT
Pt presents to the w/ c/o of left eye vision change. Pt states he was lifting at around 1400 when he heard a pop behind his left eye. Pt had vision change in the left eye and headache associated w/ nausea shortly after. Pt states symptoms has mostly resolved since then. Code stroke called @ 1730

## 2023-09-11 NOTE — CONSULT NOTE ADULT - SUBJECTIVE AND OBJECTIVE BOX
Neurology Consult    Patient is a 22y old  Male who presents with a chief complaint of left eye visual deficit    HPI:  Patient is a 22 year old gentleman without a PMH presents to the ED after left eye visual deficit while lifting weights. Patient reports bench pressing and feeling an acute "pop" behind his left eye. Patient reports standing and his left eye vision was distorted for one hour. Vastly improved vision upon arrival to ED, stroke code called.      PAST MEDICAL & SURGICAL HISTORY:  No pertinent past medical history          FAMILY HISTORY:      Social History: (-) x 3    Allergies    peanuts (Anaphylaxis)  No Known Drug Allergies    Intolerances        MEDICATIONS  (STANDING):    MEDICATIONS  (PRN):    Vital Signs Last 24 Hrs  T(C): 36.4 (11 Sep 2023 15:22), Max: 36.4 (11 Sep 2023 15:22)  T(F): 97.5 (11 Sep 2023 15:22), Max: 97.5 (11 Sep 2023 15:22)  HR: 111 (11 Sep 2023 15:22) (111 - 111)  BP: 135/72 (11 Sep 2023 15:22) (135/72 - 135/72)  BP(mean): --  RR: 18 (11 Sep 2023 15:22) (18 - 18)  SpO2: 100% (11 Sep 2023 15:22) (100% - 100%)    Parameters below as of 11 Sep 2023 15:22  Patient On (Oxygen Delivery Method): room air        Examination: Vision right eye 20/20 and left eye 20/25  General:  Appearance is consistent with chronologic age.  No abnormal facies.  Gross skin survey within normal limits.    Cognitive/Language:  The patient is oriented to person, place, time and date.  Recent and remote memory intact.  Fund of knowledge is intact and normal.  Language with normal repetition, comprehension and naming.  Nondysarthric.    Eyes: intact VA, VFF.  EOMI w/o nystagmus, skew or reported double vision.  PERRL.  No ptosis/weakness of eyelid closure.    Vastly improved left eye vision, subtle "fuzziness" remains.  Face:  Facial sensation normal V1 - 3, no facial asymmetry.    Formal Muscle Strength Testing: (MRC grade R/L) 5/5 UE; 5/5 LE  Sensory examination:   Intact to light touch in all extremities.  Cerebellum:   FTN/HKS intact with normal JOHN in all limbs. No dysmetria   Gait deferred    NIHSS 0    Labs:   CBC Full  -  ( 11 Sep 2023 15:56 )  WBC Count : 5.53 K/uL  RBC Count : 4.56 M/uL  Hemoglobin : 15.0 g/dL  Hematocrit : 42.3 %  Platelet Count - Automated : 191 K/uL  Mean Cell Volume : 92.8 fL  Mean Cell Hemoglobin : 32.9 pg  Mean Cell Hemoglobin Concentration : 35.5 g/dL  Auto Neutrophil # : 3.10 K/uL  Auto Lymphocyte # : 1.63 K/uL  Auto Monocyte # : 0.61 K/uL  Auto Eosinophil # : 0.14 K/uL  Auto Basophil # : 0.04 K/uL  Auto Neutrophil % : 56.1 %  Auto Lymphocyte % : 29.5 %  Auto Monocyte % : 11.0 %  Auto Eosinophil % : 2.5 %  Auto Basophil % : 0.7 %    09-11    140  |  103  |  11  ----------------------------<  90  3.6   |  25  |  1.2    Ca    9.8      11 Sep 2023 15:56    TPro  7.8  /  Alb  5.0  /  TBili  0.7  /  DBili  x   /  AST  24  /  ALT  24  /  AlkPhos  52  09-11    LIVER FUNCTIONS - ( 11 Sep 2023 15:56 )  Alb: 5.0 g/dL / Pro: 7.8 g/dL / ALK PHOS: 52 U/L / ALT: 24 U/L / AST: 24 U/L / GGT: x           PT/INR - ( 11 Sep 2023 15:56 )   PT: 12.00 sec;   INR: 1.05 ratio         PTT - ( 11 Sep 2023 15:56 )  PTT:38.1 sec  Urinalysis Basic - ( 11 Sep 2023 15:56 )    Color: x / Appearance: x / SG: x / pH: x  Gluc: 90 mg/dL / Ketone: x  / Bili: x / Urobili: x   Blood: x / Protein: x / Nitrite: x   Leuk Esterase: x / RBC: x / WBC x   Sq Epi: x / Non Sq Epi: x / Bacteria: x          Neuroimaging:  < from: CT Brain Stroke Protocol (09.11.23 @ 15:58) >  IMPRESSION:    No acute intracranial hemorrhage or acute large territorial infarct.    < end of copied text >    < from: CT Angio Neck Stroke Protocol w/ IV Cont (09.11.23 @ 16:22) >  IMPRESSION:    CT PERFUSION:    Right frontal lobe, Tmax greater than 6 seconds: 6 mL likely artifactual.    CTA HEAD/NECK:    No significant vascular stenosis or large vessel occlusion.    Spoke with AUGUSTIN MERCEDES NP on 9/11/2023 4:38 PM with readback.    < end of copied text >

## 2023-09-11 NOTE — ED PROVIDER NOTE - NSFOLLOWUPCLINICS_GEN_ALL_ED_FT
[facility]  Neurology  [address] [place] [address], [facility] [address]  [place] [address]  Phone: [phone]  Fax:   Follow Up Time: 1-3 Days

## 2023-09-11 NOTE — ED PROVIDER NOTE - PATIENT PORTAL LINK FT
You can access the FollowMyHealth Patient Portal offered by [facility] by registering at the following website: [email or web address]. By joining [employer]’s FollowMyHealth portal, you will also be able to view your health information using other applications (apps) compatible with our system.

## 2023-09-11 NOTE — ED ADULT NURSE NOTE - CHIEF COMPLAINT QUOTE
Pt presents to the w/ c/o of left eye vision change. Pt states he was lifting at around 1400 when he heard a pop behind his left eye. Pt had vision change in the left eye and headache associated w/ nausea shortly after. Pt states symptoms has mostly resolved since then. Code stroke called @ 5777

## 2023-09-11 NOTE — ED PROVIDER NOTE - CARE PLAN
1 Principal Discharge DX:	Vision changes   Principal Discharge DX:	Vision changes  Secondary Diagnosis:	Headache, acute
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
## 2023-09-13 PROCEDURE — 76512 OPH US DX B-SCAN: CPT | Mod: 26,50

## 2024-04-27 ENCOUNTER — EMERGENCY (EMERGENCY)
Facility: HOSPITAL | Age: 24
LOS: 0 days | Discharge: HOME | End: 2024-04-27
Attending: EMERGENCY MEDICINE
Payer: COMMERCIAL

## 2024-04-27 VITALS
WEIGHT: 179.9 LBS | TEMPERATURE: 98 F | HEIGHT: 63 IN | RESPIRATION RATE: 18 BRPM | DIASTOLIC BLOOD PRESSURE: 69 MMHG | HEART RATE: 94 BPM | OXYGEN SATURATION: 98 % | SYSTOLIC BLOOD PRESSURE: 141 MMHG

## 2024-04-27 DIAGNOSIS — Z91.010 ALLERGY TO PEANUTS: ICD-10-CM

## 2024-04-27 DIAGNOSIS — R05.1 ACUTE COUGH: ICD-10-CM

## 2024-04-27 DIAGNOSIS — J45.909 UNSPECIFIED ASTHMA, UNCOMPLICATED: ICD-10-CM

## 2024-04-27 PROBLEM — Z78.9 OTHER SPECIFIED HEALTH STATUS: Chronic | Status: ACTIVE | Noted: 2023-09-11

## 2024-04-27 PROCEDURE — 99283 EMERGENCY DEPT VISIT LOW MDM: CPT | Mod: 25

## 2024-04-27 PROCEDURE — 71046 X-RAY EXAM CHEST 2 VIEWS: CPT

## 2024-04-27 PROCEDURE — 99284 EMERGENCY DEPT VISIT MOD MDM: CPT

## 2024-04-27 PROCEDURE — 71046 X-RAY EXAM CHEST 2 VIEWS: CPT | Mod: 26

## 2024-04-27 PROCEDURE — 94640 AIRWAY INHALATION TREATMENT: CPT

## 2024-04-27 RX ORDER — IPRATROPIUM/ALBUTEROL SULFATE 18-103MCG
3 AEROSOL WITH ADAPTER (GRAM) INHALATION ONCE
Refills: 0 | Status: COMPLETED | OUTPATIENT
Start: 2024-04-27 | End: 2024-04-27

## 2024-04-27 RX ADMIN — Medication 3 MILLILITER(S): at 12:00

## 2024-04-27 NOTE — ED PROVIDER NOTE - CARE PROVIDER_API CALL
Jason Bajwa  Pulmonary Disease  [address] [place] [id number]  Phone: [phone]  Fax: [phone]  Follow Up Time:

## 2024-04-27 NOTE — ED PROVIDER NOTE - PHYSICAL EXAMINATION
VITAL SIGNS: I have reviewed nursing notes and confirm.  CONSTITUTIONAL: well-appearing, non-toxic, NAD  SKIN: Warm dry, normal skin turgor  HEAD: NCAT  EYES: no scleral icterus  ENT: Moist mucous membranes  NECK: Supple; non tender. Full ROM. No cervical LAD  CARD: RRR, no murmurs, rubs or gallops  RESP: clear to ausculation b/l.  No rales, rhonchi, or wheezing.  ABD: soft, + BS, non-tender, non-distended, no rebound or guarding.   EXT: Full ROM, no pedal edema, no calf tenderness  PSYCH: Cooperative, appropriate.

## 2024-04-27 NOTE — ED PROVIDER NOTE - ATTENDING CONTRIBUTION TO CARE
23-year-old male past medical history of childhood asthma presents with cough.  Patient reports for the last 2 weeks he has been having a dry cough this not resolving.  States he went to urgent care 2 weeks ago at that time was given a Z-Riaz, prednisone and Tessalon Perles.  Cough persisted.  Patient states that he went to urgent care 2 days ago and was started on a course of Augmentin and steroids again.  Denies any fever or chills.  Denies any chest pain or shortness of breath.  Patient states that when he gets the coughing fits he feels like his throat is spasming.  Last for few seconds and resolves.  No leg swelling or pain.  No nausea vomiting diarrhea.  No abdominal pain.  Has been using his albuterol pump at home with minimal relief.    CONSTITUTIONAL: Well-developed; well-nourished; in no acute distress.   SKIN: warm, dry  HEAD: Normocephalic; atraumatic.  EYES: PERRL, EOMI, no conjunctival erythema  ENT: No nasal discharge; airway clear. non errythematous pharynx. no exudates. protecting aiwray and secretions.   NECK: Supple; non tender.  CARD: S1, S2 normal;  Regular rate and rhythm.   RESP: No wheezes, rales or rhonchi. no respiratory distress.   ABD: soft non tender, non distended, no rebound or guarding  EXT: Normal ROM.  5/5 strength in all 4 extremities   LYMPH: No acute cervical adenopathy.  NEURO: Alert, oriented, grossly unremarkable. neurovascularly intact  PSYCH: Cooperative, appropriate.

## 2024-04-27 NOTE — ED PROVIDER NOTE - OBJECTIVE STATEMENT
abdominal region Patient is a 23-year-old male past medical history of asthma (never intubated or hospitalized) presenting for evaluation of cough for the last 2 weeks.  Patient states that when symptoms began he went to urgent care and was given azithromycin, steroids, inhaler.  Patient completed full course and was still having persistent cough and coughing fits so he went to urgent care again 2 days ago and was given more prednisone, Tessalon Perles and started on Augmentin.  Patient has not finished course of Augmentin yet.  Patient notes he has been using his albuterol pump daily with minimal relief.  He denies any wheezing.  He is concerned because he is having coughing fits.  He denies any fevers, chills, nausea, vomiting, chest pain, recent travel, leg swelling, calf pain.

## 2024-04-27 NOTE — ED PROVIDER NOTE - CLINICAL SUMMARY MEDICAL DECISION MAKING FREE TEXT BOX
Patient presents with cough.  Symptoms going on for few weeks.  Has been on a course of antibiotics and steroids.  X-ray done which was negative for acute process.  Nebulizer given.  All results discussed.  Patient advised to continue Augmentin.  Discharged with pulmonary follow-up and return precautions.

## 2024-04-27 NOTE — ED PROVIDER NOTE - PATIENT PORTAL LINK FT
You can access the FollowMyHealth Patient Portal offered by [facility] by registering at the following website: [email or web address]. By joining [employer]s FollowMyHealth portal, you will also be able to view your health information using other applications (apps) compatible with our system.

## 2024-04-27 NOTE — ED PROVIDER NOTE - NSFOLLOWUPINSTRUCTIONS_ED_ALL_ED_FT
Acute Cough    AMBULATORY CARE:    An acute cough can last up to 3 weeks. Common causes of an acute cough include a cold, allergies, or a lung infection.     Seek care immediately if:   -You have trouble breathing or feel short of breath.  -You cough up blood, or you see blood in your mucus.   -You faint or feel weak or dizzy.   -You have chest pain when you cough or take a deep breath.   -You have new wheezing.     Contact your healthcare provider if:   -You have a fever.   -Your cough lasts longer than 4 weeks.   -Your symptoms do not improve with treatment.   -You have questions or concerns about your condition or care.     Treatment: An acute cough usually goes away on its own. Ask your healthcare provider about medicines you can take to decrease your cough. You may need medicine to stop the cough, decrease swelling in your airways, or help open your airways. Medicine may also be given to help you cough up mucus. If you have an infection caused by bacteria, you may need antibiotics.     Manage your symptoms:     Do not smoke and stay away from others who smoke. Nicotine and other chemicals in cigarettes and cigars can cause lung damage and make your cough worse. Ask your healthcare provider for information if you currently smoke and need help to quit. E-cigarettes or smokeless tobacco still contain nicotine. Talk to your healthcare provider before you use these products.       Drink extra liquids as directed. Liquids will help thin and loosen mucus so you can cough it up. Liquids will also help prevent dehydration. Examples of good liquids to drink include water, fruit juice, and broth. Do not drink liquids that contain caffeine. Caffeine can increase your risk for dehydration. Ask your healthcare provider how much liquid to drink each day.       Rest as directed. Do not do activities that make your cough worse, such as exercise.       Use a humidifier or vaporizer. Use a cool mist humidifier or a vaporizer to increase air moisture in your home. This may make it easier for you to breathe and help decrease your cough.       Eat 2 to 5 mL of honey 2 times each day. Honey can help thin mucus and decrease your cough.       Use cough drops or lozenges. These can help decrease throat irritation and your cough.     Follow up with your healthcare provider as directed: Write down your questions so you remember to ask them during your visits. Our Emergency Department Referral Coordinators will be reaching out to you in the next 24-48 hours from 9:00am to 5:00pm with a follow up appointment. Please expect a phone call from the hospital in that time frame. If you do not receive a call or if you have any questions or concerns, you can reach them at   [phone]      Acute Cough    AMBULATORY CARE:    An acute cough can last up to 3 weeks. Common causes of an acute cough include a cold, allergies, or a lung infection.     Seek care immediately if:   -You have trouble breathing or feel short of breath.  -You cough up blood, or you see blood in your mucus.   -You faint or feel weak or dizzy.   -You have chest pain when you cough or take a deep breath.   -You have new wheezing.     Contact your healthcare provider if:   -You have a fever.   -Your cough lasts longer than 4 weeks.   -Your symptoms do not improve with treatment.   -You have questions or concerns about your condition or care.     Treatment: An acute cough usually goes away on its own. Ask your healthcare provider about medicines you can take to decrease your cough. You may need medicine to stop the cough, decrease swelling in your airways, or help open your airways. Medicine may also be given to help you cough up mucus. If you have an infection caused by bacteria, you may need antibiotics.     Manage your symptoms:     Do not smoke and stay away from others who smoke. Nicotine and other chemicals in cigarettes and cigars can cause lung damage and make your cough worse. Ask your healthcare provider for information if you currently smoke and need help to quit. E-cigarettes or smokeless tobacco still contain nicotine. Talk to your healthcare provider before you use these products.       Drink extra liquids as directed. Liquids will help thin and loosen mucus so you can cough it up. Liquids will also help prevent dehydration. Examples of good liquids to drink include water, fruit juice, and broth. Do not drink liquids that contain caffeine. Caffeine can increase your risk for dehydration. Ask your healthcare provider how much liquid to drink each day.       Rest as directed. Do not do activities that make your cough worse, such as exercise.       Use a humidifier or vaporizer. Use a cool mist humidifier or a vaporizer to increase air moisture in your home. This may make it easier for you to breathe and help decrease your cough.       Eat 2 to 5 mL of honey 2 times each day. Honey can help thin mucus and decrease your cough.       Use cough drops or lozenges. These can help decrease throat irritation and your cough.     Follow up with your healthcare provider as directed: Write down your questions so you remember to ask them during your visits.

## 2024-05-07 NOTE — CHART NOTE - NSCHARTNOTEFT_GEN_A_CORE
emailed pulm 4/29, MV/ as per Pulmo, left  4/30- AC/ scheduled on 6/10/24 @ 2p w/ Dr. Bajwa @ 501- 5/7- AC

## 2024-06-10 ENCOUNTER — APPOINTMENT (OUTPATIENT)
Dept: PULMONOLOGY | Facility: CLINIC | Age: 24
End: 2024-06-10

## 2025-05-15 NOTE — ED ADULT TRIAGE NOTE - ARRIVAL FROM
Application Tool (Optional): Liquid Nitrogen Sprayer Show Applicator Variable?: Yes Consent: The patient's consent was obtained including but not limited to risks of crusting, scabbing, blistering, scarring, darker or lighter pigmentary change, recurrence, incomplete removal and infection. Detail Level: Detailed Number Of Freeze-Thaw Cycles: 1 freeze-thaw cycle Render Note In Bullet Format When Appropriate: No Duration Of Freeze Thaw-Cycle (Seconds): 5 Post-Care Instructions: I reviewed with the patient in detail post-care instructions. Patient is to wear sunprotection, and avoid picking at any of the treated lesions. Pt may apply Vaseline to crusted or scabbing areas. Home
